# Patient Record
Sex: FEMALE | Race: WHITE
[De-identification: names, ages, dates, MRNs, and addresses within clinical notes are randomized per-mention and may not be internally consistent; named-entity substitution may affect disease eponyms.]

---

## 2017-01-14 ENCOUNTER — HOSPITAL ENCOUNTER (EMERGENCY)
Dept: HOSPITAL 76 - ED | Age: 47
Discharge: HOME | End: 2017-01-14
Payer: MEDICARE

## 2017-01-14 VITALS — DIASTOLIC BLOOD PRESSURE: 73 MMHG | SYSTOLIC BLOOD PRESSURE: 127 MMHG

## 2017-01-14 DIAGNOSIS — G47.00: ICD-10-CM

## 2017-01-14 DIAGNOSIS — M79.1: Primary | ICD-10-CM

## 2017-01-14 DIAGNOSIS — M54.32: ICD-10-CM

## 2017-01-14 DIAGNOSIS — F32.9: ICD-10-CM

## 2017-01-14 DIAGNOSIS — E11.42: ICD-10-CM

## 2017-01-14 DIAGNOSIS — Z87.891: ICD-10-CM

## 2017-01-14 DIAGNOSIS — Z79.84: ICD-10-CM

## 2017-01-14 DIAGNOSIS — J45.909: ICD-10-CM

## 2017-01-14 LAB
ALBUMIN/GLOB SERPL: 1.1 {RATIO} (ref 1–2.2)
ANION GAP SERPL CALCULATED.4IONS-SCNC: 9 MMOL/L (ref 6–13)
BASOPHILS NFR BLD AUTO: 0.1 10^3/UL (ref 0–0.1)
BASOPHILS NFR BLD AUTO: 0.6 %
BILIRUB BLD-MCNC: 0.5 MG/DL (ref 0.2–1)
BUN SERPL-MCNC: 17 MG/DL (ref 6–20)
CALCIUM UR-MCNC: 9 MG/DL (ref 8.5–10.3)
CHLORIDE SERPL-SCNC: 103 MMOL/L (ref 101–111)
CO2 SERPL-SCNC: 27 MMOL/L (ref 21–32)
CREAT SERPLBLD-SCNC: 0.7 MG/DL (ref 0.4–1)
CUL URINE ADD CHARGE: (no result)
EOSINOPHIL # BLD AUTO: 0.2 10^3/UL (ref 0–0.7)
EOSINOPHIL NFR BLD AUTO: 2.2 %
ERYTHROCYTE [DISTWIDTH] IN BLOOD BY AUTOMATED COUNT: 16.8 % (ref 12–15)
GFRSERPLBLD MDRD-ARVRAT: 91 ML/MIN/{1.73_M2} (ref 89–?)
GLOBULIN SER-MCNC: 3.7 G/DL (ref 2.1–4.2)
GLUCOSE SERPL-MCNC: 169 MG/DL (ref 70–100)
HCG UR QL: NEGATIVE
HCT VFR BLD AUTO: 38.6 % (ref 37–47)
HGB UR QL STRIP: 12 G/DL (ref 12–16)
LIPASE SERPL-CCNC: 27 U/L (ref 22–51)
LYMPHOCYTES # SPEC AUTO: 3 10^3/UL (ref 1.5–3.5)
LYMPHOCYTES NFR BLD AUTO: 34 %
MCH RBC QN AUTO: 23.9 PG (ref 27–31)
MCHC RBC AUTO-ENTMCNC: 31 G/DL (ref 32–36)
MCV RBC AUTO: 76.9 FL (ref 81–99)
MONOCYTES # BLD AUTO: 0.6 10^3/UL (ref 0–1)
MONOCYTES NFR BLD AUTO: 6.6 %
NEUTROPHILS # BLD AUTO: 5 10^3/UL (ref 1.5–6.6)
NEUTROPHILS # SNV AUTO: 8.9 X10^3/UL (ref 4.8–10.8)
NEUTROPHILS NFR BLD AUTO: 56.6 %
NRBC # BLD AUTO: 0 /100WBC
PDW BLD AUTO: 9.6 FL (ref 7.9–10.8)
PH UR STRIP.AUTO: 5 PH (ref 5–7.5)
POTASSIUM SERPL-SCNC: 3.9 MMOL/L (ref 3.5–5)
PROT SPEC-MCNC: 7.6 G/DL (ref 6.7–8.2)
RBC MAR: 5.02 10^6/UL (ref 4.2–5.4)
SODIUM SERPLBLD-SCNC: 139 MMOL/L (ref 135–145)
SP GR UR STRIP.AUTO: 1.02 (ref 1–1.03)
UA CHARGE (STRIP ONLY): YES
UA W/ MICROSCOPIC CHARGE: (no result)
UR CULTURE IF IND: (no result)
UROBILINOGEN UR STRIP.AUTO-MCNC: NEGATIVE MG/DL
WBC # BLD: 8.9 X10^3/UL

## 2017-01-14 PROCEDURE — 81003 URINALYSIS AUTO W/O SCOPE: CPT

## 2017-01-14 PROCEDURE — 87086 URINE CULTURE/COLONY COUNT: CPT

## 2017-01-14 PROCEDURE — 85025 COMPLETE CBC W/AUTO DIFF WBC: CPT

## 2017-01-14 PROCEDURE — 82550 ASSAY OF CK (CPK): CPT

## 2017-01-14 PROCEDURE — 81025 URINE PREGNANCY TEST: CPT

## 2017-01-14 PROCEDURE — 99284 EMERGENCY DEPT VISIT MOD MDM: CPT

## 2017-01-14 PROCEDURE — 36415 COLL VENOUS BLD VENIPUNCTURE: CPT

## 2017-01-14 PROCEDURE — 80053 COMPREHEN METABOLIC PANEL: CPT

## 2017-01-14 PROCEDURE — 99283 EMERGENCY DEPT VISIT LOW MDM: CPT

## 2017-01-14 PROCEDURE — 81001 URINALYSIS AUTO W/SCOPE: CPT

## 2017-01-14 PROCEDURE — 96372 THER/PROPH/DIAG INJ SC/IM: CPT

## 2017-01-14 PROCEDURE — 83690 ASSAY OF LIPASE: CPT

## 2017-01-14 RX ADMIN — KETOROLAC TROMETHAMINE STA MG: 30 INJECTION, SOLUTION INTRAMUSCULAR at 19:41

## 2017-04-07 ENCOUNTER — HOSPITAL ENCOUNTER (EMERGENCY)
Age: 47
Discharge: HOME | End: 2017-04-07
Payer: MEDICARE

## 2017-04-07 DIAGNOSIS — Z79.84: ICD-10-CM

## 2017-04-07 DIAGNOSIS — E78.00: ICD-10-CM

## 2017-04-07 DIAGNOSIS — E11.42: ICD-10-CM

## 2017-04-07 DIAGNOSIS — M25.512: ICD-10-CM

## 2017-04-07 DIAGNOSIS — J45.909: ICD-10-CM

## 2017-04-07 DIAGNOSIS — Z87.891: ICD-10-CM

## 2017-04-07 DIAGNOSIS — M67.442: Primary | ICD-10-CM

## 2017-04-17 ENCOUNTER — HOSPITAL ENCOUNTER (OUTPATIENT)
Age: 47
Discharge: HOME | End: 2017-04-17
Payer: MEDICARE

## 2017-04-17 DIAGNOSIS — M25.512: Primary | ICD-10-CM

## 2017-04-23 ENCOUNTER — HOSPITAL ENCOUNTER (EMERGENCY)
Dept: HOSPITAL 21 - SED | Age: 47
Discharge: HOME | End: 2017-04-23
Payer: MEDICARE

## 2017-04-23 VITALS
OXYGEN SATURATION: 96 % | HEART RATE: 100 BPM | RESPIRATION RATE: 16 BRPM | SYSTOLIC BLOOD PRESSURE: 119 MMHG | DIASTOLIC BLOOD PRESSURE: 77 MMHG

## 2017-04-23 VITALS — BODY MASS INDEX: 37.75 KG/M2 | HEIGHT: 67 IN | WEIGHT: 240.5 LBS

## 2017-04-23 VITALS
HEART RATE: 94 BPM | RESPIRATION RATE: 16 BRPM | DIASTOLIC BLOOD PRESSURE: 79 MMHG | SYSTOLIC BLOOD PRESSURE: 115 MMHG | OXYGEN SATURATION: 95 %

## 2017-04-23 DIAGNOSIS — Z91.010: ICD-10-CM
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
## 2017-04-23 DIAGNOSIS — E11.9: ICD-10-CM
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
## 2017-04-23 DIAGNOSIS — M25.512: ICD-10-CM

## 2017-04-23 DIAGNOSIS — G89.29: Primary | ICD-10-CM
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
## 2017-04-23 PROCEDURE — 99284 EMERGENCY DEPT VISIT MOD MDM: CPT

## 2017-04-23 PROCEDURE — 96372 THER/PROPH/DIAG INJ SC/IM: CPT

## 2017-04-23 NOTE — ED.REPORT
HPI-Extremity Problem Upper


Date of Service


Apr 23, 2017


 ED Provider: Cherelle Palma MD


The patient is a 46 year old female w/ a hx of DM who presents to the ED due to 

left shoulder and upper left arm pain increased in severity yesterday after 

doing yard work. She reports that she cannot lift a glass to her mouth with her 

left hand because her shoulder hurts too badly. She describes the pain as "

sharp and dull." Movement severely exacerbates her pain. She has been seeing 

her PCP regularly to try to reduce her symptoms. She went to an orthopedist a 

week ago and they told her to continue to go to the gym and exercise. They have 

a plan for cortisone shots and an eventual MRI if the pain does not keith. She 

has a ganglion cyst on her left hand. She denies chest pain, abdominal pain, 

and nausea.


Nursing Notes


Stated Complaint:  LEFT SHOULDER/ARM PAIN


Chief Complaint:  Extremity Trauma


Nursing Notes Reviewed:  Yes


Allergies:  


Coded Allergies:  


     peanut (Verified  Allergy, Severe, Anaphylaxis, 4/23/17)


Scheduled PRN


Lorazepam (Lorazepam) 2 Mg Tablet 2 MG PO HS PRN PRN For Insomnia 





General


Time Seen by MD:  15:17





Chief Complaint


Shoulder injury left


Hx Obtained From:  Patient


Arrived By:  Walk-in


Onset Occurred:  Yesterday


Symptom Duration:  Since onset


Location:  : Arm left: Shoulder left


Quality:  Dull, Painful


Severity: Current:  Severe


Exacerbated by:  Range of motion, Movement


Recent Healthcare:  Recent doctor visit


Similar Sx Previous:  Yes





Past Medical History


Past Medical History





Manic depressive


Reports: Diabetes mellitus





Past Surgical History





Denies





Smoking History


Unknown if Ever Smoker





Social History


Other Social History:  Good social support, Local resident





Ambulatory Status


Independent





Review of Systems


Musculoskeletal:  Reports: Extremity pain, Joint pain


Complete sys rev & neg:  except as marked.


Cardiovascular:  Denies: Chest pain


GI:  Denies: Abdominal pain, Nausea





Physical Exam


Initial Vital Signs





 Vital Signs (First)








  Date Time  Temp Pulse Resp B/P Pulse Ox O2 Delivery O2 Flow Rate FiO2


 


4/23/17 15:09 36.5 100 16 119/77 96 Room Air  








Initial VS:  Reviewed, Vital signs abnormal


General/Constitutional:  Awake, Alert





Re-Eval/Medical Decision


Med Decision/Clinical Course


The patient presents with an acute worsening of chronic symptoms.  There is no 

sign of a joint infection or cellulitis, patient is neurologically intact.  She 

has a known shoulder injury and thinks she aggravated from swatting at a fly 

yesterday.  There is no deformity and she has good functional she has pain with 

movement of her arm.


Re-Evaluation/Progress :  


   Time of Eval:  16:27


   Patient Status:  Moderate relief


   Re-Evaluation/Progress Note:  


Pt rechecked. Pain is improved after medication.   F/U and RTER warnings


given. Pt understands and agrees with plan.


Counseled Regarding:  Diagnosis, Lab results, Need for follow-up, When/why to 

return to ED





Discharge & Departure


Impression:  


 Primary Impression:  


 Chronic left shoulder pain


Disposition:  Home


Discharge Condition


All VS Reviewed:  Yes


Condition:  Stable





Additional Instructions:


Continue with the plan of treatment with your primary care physician and 

orthopedist. If your pain is increasing, see your orthopedist sooner. Return to 

the Emergency Department for any new or worsening symptoms. I hope you feel 

better soon!


Referrals:  


OTHER,PHYSICIAN (PCP)


Scribe Attestation


Portion of this note were transcribed by Celina Perrin. I, Dr. Palma, personally 

performed the history, physical exam, and medical decision-making: I reviewed 

and confirmed the accuracy for the information in the transcribed note.


 


Signed by: helen Shah, 4/23/17 1700


copies to:   OTHER,PHYSICIAN








Cherelle Palma MD Apr 23, 2017 15:18


Celina Perrin Apr 23, 2017 15:30

## 2017-07-11 ENCOUNTER — HOSPITAL ENCOUNTER (EMERGENCY)
Dept: HOSPITAL 76 - ED | Age: 47
Discharge: HOME | End: 2017-07-11
Payer: MEDICARE

## 2017-07-11 VITALS — SYSTOLIC BLOOD PRESSURE: 120 MMHG | DIASTOLIC BLOOD PRESSURE: 86 MMHG

## 2017-07-11 DIAGNOSIS — E11.42: ICD-10-CM

## 2017-07-11 DIAGNOSIS — H66.002: Primary | ICD-10-CM

## 2017-07-11 DIAGNOSIS — J45.909: ICD-10-CM

## 2017-07-11 DIAGNOSIS — Z79.84: ICD-10-CM

## 2017-07-11 DIAGNOSIS — Z87.891: ICD-10-CM

## 2017-07-11 DIAGNOSIS — E78.5: ICD-10-CM

## 2017-07-11 PROCEDURE — 99283 EMERGENCY DEPT VISIT LOW MDM: CPT

## 2017-07-11 PROCEDURE — 96372 THER/PROPH/DIAG INJ SC/IM: CPT

## 2017-07-11 PROCEDURE — 71020: CPT

## 2017-07-22 ENCOUNTER — HOSPITAL ENCOUNTER (EMERGENCY)
Dept: HOSPITAL 76 - ED | Age: 47
Discharge: HOME | End: 2017-07-22
Payer: MEDICARE

## 2017-07-22 VITALS — DIASTOLIC BLOOD PRESSURE: 78 MMHG | SYSTOLIC BLOOD PRESSURE: 112 MMHG

## 2017-07-22 DIAGNOSIS — J45.909: ICD-10-CM

## 2017-07-22 DIAGNOSIS — E11.42: ICD-10-CM

## 2017-07-22 DIAGNOSIS — Z79.84: ICD-10-CM

## 2017-07-22 DIAGNOSIS — Z87.891: ICD-10-CM

## 2017-07-22 DIAGNOSIS — J06.9: ICD-10-CM

## 2017-07-22 DIAGNOSIS — B37.3: Primary | ICD-10-CM

## 2017-07-22 DIAGNOSIS — E78.00: ICD-10-CM

## 2017-07-22 PROCEDURE — 99282 EMERGENCY DEPT VISIT SF MDM: CPT

## 2017-07-22 PROCEDURE — 99283 EMERGENCY DEPT VISIT LOW MDM: CPT

## 2017-07-22 PROCEDURE — 94640 AIRWAY INHALATION TREATMENT: CPT

## 2017-10-03 ENCOUNTER — HOSPITAL ENCOUNTER (EMERGENCY)
Dept: HOSPITAL 76 - ED | Age: 47
Discharge: HOME | End: 2017-10-03
Payer: MEDICARE

## 2017-10-03 VITALS — DIASTOLIC BLOOD PRESSURE: 68 MMHG | SYSTOLIC BLOOD PRESSURE: 121 MMHG

## 2017-10-03 DIAGNOSIS — E11.42: ICD-10-CM

## 2017-10-03 DIAGNOSIS — E78.00: ICD-10-CM

## 2017-10-03 DIAGNOSIS — J45.909: ICD-10-CM

## 2017-10-03 DIAGNOSIS — J02.0: Primary | ICD-10-CM

## 2017-10-03 DIAGNOSIS — Z79.84: ICD-10-CM

## 2017-10-03 DIAGNOSIS — Z85.828: ICD-10-CM

## 2017-10-03 LAB — RAPID STREP SCREEN REAGENT QC: YELLOW

## 2017-10-03 PROCEDURE — 99283 EMERGENCY DEPT VISIT LOW MDM: CPT

## 2017-10-03 PROCEDURE — 87430 STREP A AG IA: CPT

## 2017-10-03 PROCEDURE — 96372 THER/PROPH/DIAG INJ SC/IM: CPT

## 2017-12-22 ENCOUNTER — HOSPITAL ENCOUNTER (EMERGENCY)
Dept: HOSPITAL 76 - ED | Age: 47
Discharge: HOME | End: 2017-12-22
Payer: MEDICARE

## 2017-12-22 VITALS — SYSTOLIC BLOOD PRESSURE: 117 MMHG | DIASTOLIC BLOOD PRESSURE: 62 MMHG

## 2017-12-22 DIAGNOSIS — K21.9: Primary | ICD-10-CM

## 2017-12-22 DIAGNOSIS — Z79.84: ICD-10-CM

## 2017-12-22 DIAGNOSIS — E11.42: ICD-10-CM

## 2017-12-22 DIAGNOSIS — E78.00: ICD-10-CM

## 2017-12-22 PROCEDURE — 84484 ASSAY OF TROPONIN QUANT: CPT

## 2017-12-22 PROCEDURE — 99283 EMERGENCY DEPT VISIT LOW MDM: CPT

## 2017-12-22 PROCEDURE — 93005 ELECTROCARDIOGRAM TRACING: CPT

## 2017-12-22 PROCEDURE — 36415 COLL VENOUS BLD VENIPUNCTURE: CPT

## 2017-12-22 PROCEDURE — 99285 EMERGENCY DEPT VISIT HI MDM: CPT

## 2018-05-06 ENCOUNTER — HOSPITAL ENCOUNTER (EMERGENCY)
Dept: HOSPITAL 76 - ED | Age: 48
Discharge: HOME | End: 2018-05-06
Payer: MEDICARE

## 2018-05-06 VITALS — DIASTOLIC BLOOD PRESSURE: 61 MMHG | SYSTOLIC BLOOD PRESSURE: 113 MMHG

## 2018-05-06 DIAGNOSIS — E78.00: ICD-10-CM

## 2018-05-06 DIAGNOSIS — Z79.84: ICD-10-CM

## 2018-05-06 DIAGNOSIS — M75.91: Primary | ICD-10-CM

## 2018-05-06 DIAGNOSIS — E11.9: ICD-10-CM

## 2018-05-06 PROCEDURE — 99283 EMERGENCY DEPT VISIT LOW MDM: CPT

## 2018-12-18 ENCOUNTER — HOSPITAL ENCOUNTER (OUTPATIENT)
Dept: HOSPITAL 76 - LAB | Age: 48
Discharge: HOME | End: 2018-12-18
Attending: ORTHOPAEDIC SURGERY
Payer: MEDICARE

## 2018-12-18 DIAGNOSIS — Z01.812: Primary | ICD-10-CM

## 2018-12-18 DIAGNOSIS — R73.9: ICD-10-CM

## 2018-12-18 LAB
ANION GAP SERPL CALCULATED.4IONS-SCNC: 9 MMOL/L (ref 6–13)
BUN SERPL-MCNC: 19 MG/DL (ref 6–20)
CALCIUM UR-MCNC: 9.4 MG/DL (ref 8.5–10.3)
CHLORIDE SERPL-SCNC: 101 MMOL/L (ref 101–111)
CO2 SERPL-SCNC: 28 MMOL/L (ref 21–32)
CREAT SERPLBLD-SCNC: 0.7 MG/DL (ref 0.4–1)
ERYTHROCYTE [DISTWIDTH] IN BLOOD BY AUTOMATED COUNT: 18.5 % (ref 12–15)
EST. AVERAGE GLUCOSE BLD GHB EST-MCNC: 134 MG/DL (ref 70–100)
GFRSERPLBLD MDRD-ARVRAT: 90 ML/MIN/{1.73_M2} (ref 89–?)
GLUCOSE SERPL-MCNC: 114 MG/DL (ref 70–100)
HB2 TOTAL: 13.1 G/DL
HBA1C BLD-MCNC: 0.59 G/DL
HEMOGLOBIN A1C %: 6.3 % (ref 4.6–6.2)
HGB UR QL STRIP: 12.5 G/DL (ref 12–16)
MCH RBC QN AUTO: 26.4 PG (ref 27–31)
MCHC RBC AUTO-ENTMCNC: 33 G/DL (ref 32–36)
MCV RBC AUTO: 80.1 FL (ref 81–99)
NEUTROPHILS # SNV AUTO: 7.3 X10^3/UL (ref 4.8–10.8)
PDW BLD AUTO: 9.2 FL (ref 7.9–10.8)
PLATELET # BLD: 197 10^3/UL (ref 130–450)
RBC MAR: 4.72 10^6/UL (ref 4.2–5.4)
SODIUM SERPLBLD-SCNC: 138 MMOL/L (ref 135–145)

## 2018-12-18 PROCEDURE — 36415 COLL VENOUS BLD VENIPUNCTURE: CPT

## 2018-12-18 PROCEDURE — 93005 ELECTROCARDIOGRAM TRACING: CPT

## 2018-12-18 PROCEDURE — 83036 HEMOGLOBIN GLYCOSYLATED A1C: CPT

## 2018-12-18 PROCEDURE — 80048 BASIC METABOLIC PNL TOTAL CA: CPT

## 2018-12-18 PROCEDURE — 85027 COMPLETE CBC AUTOMATED: CPT

## 2019-09-17 ENCOUNTER — HOSPITAL ENCOUNTER (EMERGENCY)
Dept: HOSPITAL 76 - ED | Age: 49
Discharge: HOME | End: 2019-09-17
Payer: MEDICARE

## 2019-09-17 VITALS — SYSTOLIC BLOOD PRESSURE: 121 MMHG | DIASTOLIC BLOOD PRESSURE: 76 MMHG

## 2019-09-17 DIAGNOSIS — Z79.84: ICD-10-CM

## 2019-09-17 DIAGNOSIS — M54.5: Primary | ICD-10-CM

## 2019-09-17 DIAGNOSIS — E11.65: ICD-10-CM

## 2019-09-17 LAB
ALBUMIN DIAFP-MCNC: 4 G/DL (ref 3.2–5.5)
ALBUMIN/GLOB SERPL: 1.1 {RATIO} (ref 1–2.2)
ALP SERPL-CCNC: 23 IU/L (ref 42–121)
ALT SERPL W P-5'-P-CCNC: 12 IU/L (ref 10–60)
ANION GAP SERPL CALCULATED.4IONS-SCNC: 11 MMOL/L (ref 6–13)
AST SERPL W P-5'-P-CCNC: 19 IU/L (ref 10–42)
BASOPHILS NFR BLD AUTO: 0.1 10^3/UL (ref 0–0.1)
BASOPHILS NFR BLD AUTO: 0.7 %
BILIRUB BLD-MCNC: 0.5 MG/DL (ref 0.2–1)
BILIRUB UR QL CFM: NEGATIVE
BUN SERPL-MCNC: 15 MG/DL (ref 6–20)
CALCIUM UR-MCNC: 9.3 MG/DL (ref 8.5–10.3)
CASTS URNS QL MICRO: (no result) /LPF
CHLORIDE SERPL-SCNC: 101 MMOL/L (ref 101–111)
CLARITY UR REFRACT.AUTO: (no result)
CO2 SERPL-SCNC: 30 MMOL/L (ref 21–32)
CREAT SERPLBLD-SCNC: 0.8 MG/DL (ref 0.4–1)
EOSINOPHIL # BLD AUTO: 0.3 10^3/UL (ref 0–0.7)
EOSINOPHIL NFR BLD AUTO: 3.6 %
ERYTHROCYTE [DISTWIDTH] IN BLOOD BY AUTOMATED COUNT: 14.7 % (ref 12–15)
GFRSERPLBLD MDRD-ARVRAT: 77 ML/MIN/{1.73_M2} (ref 89–?)
GLOBULIN SER-MCNC: 3.8 G/DL (ref 2.1–4.2)
GLUCOSE SERPL-MCNC: 125 MG/DL (ref 70–100)
GLUCOSE UR QL STRIP.AUTO: NEGATIVE MG/DL
HGB UR QL STRIP: 12.7 G/DL (ref 12–16)
KETONES UR QL STRIP.AUTO: 15 MG/DL
LIPASE SERPL-CCNC: 30 U/L (ref 22–51)
LYMPHOCYTES # SPEC AUTO: 3.1 10^3/UL (ref 1.5–3.5)
LYMPHOCYTES NFR BLD AUTO: 35.9 %
MCH RBC QN AUTO: 27.5 PG (ref 27–31)
MCHC RBC AUTO-ENTMCNC: 31.3 G/DL (ref 32–36)
MCV RBC AUTO: 87.9 FL (ref 81–99)
MONOCYTES # BLD AUTO: 0.6 10^3/UL (ref 0–1)
MONOCYTES NFR BLD AUTO: 7.5 %
NEUTROPHILS # BLD AUTO: 4.5 10^3/UL (ref 1.5–6.6)
NEUTROPHILS # SNV AUTO: 8.6 X10^3/UL (ref 4.8–10.8)
NEUTROPHILS NFR BLD AUTO: 52.1 %
NITRITE UR QL STRIP.AUTO: NEGATIVE
PDW BLD AUTO: 11.6 FL (ref 7.9–10.8)
PH UR STRIP.AUTO: 6 PH (ref 5–7.5)
PLATELET # BLD: 223 10^3/UL (ref 130–450)
PROT SPEC-MCNC: 7.8 G/DL (ref 6.7–8.2)
PROT UR STRIP.AUTO-MCNC: (no result) MG/DL
RBC # UR STRIP.AUTO: (no result) /UL
RBC # URNS HPF: (no result) /HPF (ref 0–5)
RBC MAR: 4.62 10^6/UL (ref 4.2–5.4)
SODIUM SERPLBLD-SCNC: 142 MMOL/L (ref 135–145)
SP GR UR STRIP.AUTO: >=1.03 (ref 1–1.03)
SQUAMOUS URNS QL MICRO: (no result)
UROBILINOGEN UR QL STRIP.AUTO: (no result) E.U./DL
UROBILINOGEN UR STRIP.AUTO-MCNC: NEGATIVE MG/DL

## 2019-09-17 PROCEDURE — 36415 COLL VENOUS BLD VENIPUNCTURE: CPT

## 2019-09-17 PROCEDURE — 85025 COMPLETE CBC W/AUTO DIFF WBC: CPT

## 2019-09-17 PROCEDURE — 81001 URINALYSIS AUTO W/SCOPE: CPT

## 2019-09-17 PROCEDURE — 99283 EMERGENCY DEPT VISIT LOW MDM: CPT

## 2019-09-17 PROCEDURE — 87086 URINE CULTURE/COLONY COUNT: CPT

## 2019-09-17 PROCEDURE — 80053 COMPREHEN METABOLIC PANEL: CPT

## 2019-09-17 PROCEDURE — 81003 URINALYSIS AUTO W/O SCOPE: CPT

## 2019-09-17 PROCEDURE — 83690 ASSAY OF LIPASE: CPT

## 2021-07-24 ENCOUNTER — HOSPITAL ENCOUNTER (OUTPATIENT)
Dept: HOSPITAL 76 - EMS | Age: 51
Discharge: ANOTHER HOSPITAL | End: 2021-07-24
Payer: MEDICARE

## 2021-07-24 DIAGNOSIS — R55: Primary | ICD-10-CM

## 2024-01-24 ENCOUNTER — HOSPITAL ENCOUNTER (EMERGENCY)
Dept: HOSPITAL 76 - ED | Age: 54
Discharge: HOME | End: 2024-01-24
Payer: MEDICARE

## 2024-01-24 VITALS — OXYGEN SATURATION: 98 % | DIASTOLIC BLOOD PRESSURE: 79 MMHG | SYSTOLIC BLOOD PRESSURE: 156 MMHG

## 2024-01-24 DIAGNOSIS — S76.012A: Primary | ICD-10-CM

## 2024-01-24 DIAGNOSIS — E11.9: ICD-10-CM

## 2024-01-24 DIAGNOSIS — Z79.84: ICD-10-CM
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
## 2024-01-24 PROCEDURE — 99283 EMERGENCY DEPT VISIT LOW MDM: CPT

## 2024-01-24 PROCEDURE — 99282 EMERGENCY DEPT VISIT SF MDM: CPT
